# Patient Record
Sex: MALE | Race: WHITE | NOT HISPANIC OR LATINO | Employment: UNEMPLOYED | URBAN - NONMETROPOLITAN AREA
[De-identification: names, ages, dates, MRNs, and addresses within clinical notes are randomized per-mention and may not be internally consistent; named-entity substitution may affect disease eponyms.]

---

## 2023-11-20 ENCOUNTER — OFFICE VISIT (OUTPATIENT)
Dept: FAMILY MEDICINE CLINIC | Facility: CLINIC | Age: 33
End: 2023-11-20
Payer: COMMERCIAL

## 2023-11-20 VITALS
OXYGEN SATURATION: 97 % | HEIGHT: 71 IN | DIASTOLIC BLOOD PRESSURE: 90 MMHG | BODY MASS INDEX: 34.87 KG/M2 | HEART RATE: 69 BPM | WEIGHT: 249.1 LBS | SYSTOLIC BLOOD PRESSURE: 140 MMHG

## 2023-11-20 DIAGNOSIS — Z13.29 SCREENING FOR THYROID DISORDER: ICD-10-CM

## 2023-11-20 DIAGNOSIS — R19.7 DIARRHEA, UNSPECIFIED TYPE: Primary | ICD-10-CM

## 2023-11-20 DIAGNOSIS — K21.9 GASTROESOPHAGEAL REFLUX DISEASE, UNSPECIFIED WHETHER ESOPHAGITIS PRESENT: ICD-10-CM

## 2023-11-20 DIAGNOSIS — I82.890 BLOOD CLOT OF NECK VEIN: ICD-10-CM

## 2023-11-20 DIAGNOSIS — Z13.220 LIPID SCREENING: ICD-10-CM

## 2023-11-20 DIAGNOSIS — Z11.59 NEED FOR HEPATITIS C SCREENING TEST: ICD-10-CM

## 2023-11-20 DIAGNOSIS — Z13.1 SCREENING FOR DIABETES MELLITUS: ICD-10-CM

## 2023-11-20 RX ORDER — OMEPRAZOLE 40 MG/1
40 CAPSULE, DELAYED RELEASE ORAL DAILY
Qty: 30 CAPSULE | Refills: 2 | Status: SHIPPED | OUTPATIENT
Start: 2023-11-20
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
## 2023-11-21 PROBLEM — I82.890 BLOOD CLOT OF NECK VEIN: Status: ACTIVE | Noted: 2023-11-21

## 2023-11-26 PROBLEM — R19.7 DIARRHEA: Status: ACTIVE | Noted: 2023-11-26

## 2023-11-26 PROBLEM — K21.9 GASTROESOPHAGEAL REFLUX DISEASE: Status: ACTIVE | Noted: 2023-11-26

## 2023-11-26 NOTE — ASSESSMENT & PLAN NOTE
His diarrhea has been chronic since his splenectomy.  We will schedule him for a gallbladder ultrasound and for further evaluation with GI.  He is in agreement with this plan.

## 2023-11-26 NOTE — ASSESSMENT & PLAN NOTE
His GERD is chronic and not currently controlled.  I will start him on omeprazole and schedule him with GI for further evaluation.  He is in agreement with this plan.

## 2023-11-26 NOTE — ASSESSMENT & PLAN NOTE
He is unsure of the exact history of his clot, and he has not been on blood thinners for 5 years.  He was only treated for 1 to 2 months, and he is asymptomatic.  We will check for some clotting factors and then proceed pending results.

## 2023-11-28 ENCOUNTER — PATIENT ROUNDING (BHMG ONLY) (OUTPATIENT)
Dept: FAMILY MEDICINE CLINIC | Facility: CLINIC | Age: 33
End: 2023-11-28
Payer: COMMERCIAL

## 2023-11-28 NOTE — PROGRESS NOTES
November 28, 2023    Hello, may I speak with Chanda Fontanez?    My name is STEPHANIE      I am  with [facility]  [address] [place] [address] [address] [place] [address] [phone].    Before we get started may I verify your date of birth? 1990    I am calling to officially welcome you to our practice and ask about your recent visit. Is this a good time to talk? yes    Tell me about your visit with us. What things went well?  PROVIDER WAS VERY THOROUGH,       We're always looking for ways to make our patients' experiences even better. Do you have recommendations on ways we may improve?  no    Overall were you satisfied with your first visit to our practice? yes       I appreciate you taking the time to speak with me today. Is there anything else I can do for you? no      Thank you, and have a great day.

## 2023-12-05 ENCOUNTER — OFFICE VISIT (OUTPATIENT)
Dept: GASTROENTEROLOGY | Facility: CLINIC | Age: 33
End: 2023-12-05
Payer: MEDICAID

## 2023-12-05 VITALS
BODY MASS INDEX: 35.7 KG/M2 | TEMPERATURE: 98.6 F | WEIGHT: 255 LBS | DIASTOLIC BLOOD PRESSURE: 78 MMHG | OXYGEN SATURATION: 99 % | RESPIRATION RATE: 18 BRPM | SYSTOLIC BLOOD PRESSURE: 142 MMHG | HEIGHT: 71 IN | HEART RATE: 82 BPM

## 2023-12-05 DIAGNOSIS — R19.7 DIARRHEA, UNSPECIFIED TYPE: Primary | ICD-10-CM

## 2023-12-05 DIAGNOSIS — R10.31 INTERMITTENT RIGHT LOWER QUADRANT ABDOMINAL PAIN: ICD-10-CM

## 2023-12-05 DIAGNOSIS — K21.9 GASTROESOPHAGEAL REFLUX DISEASE, UNSPECIFIED WHETHER ESOPHAGITIS PRESENT: ICD-10-CM

## 2023-12-05 DIAGNOSIS — K92.1 BLOOD IN STOOL: ICD-10-CM

## 2023-12-05 DIAGNOSIS — R10.31 INTRACTABLE RIGHT LOWER QUADRANT ABDOMINAL PAIN: ICD-10-CM

## 2023-12-05 PROCEDURE — 99204 OFFICE O/P NEW MOD 45 MIN: CPT | Performed by: INTERNAL MEDICINE

## 2023-12-05 PROCEDURE — 1159F MED LIST DOCD IN RCRD: CPT | Performed by: INTERNAL MEDICINE

## 2023-12-05 PROCEDURE — 1160F RVW MEDS BY RX/DR IN RCRD: CPT | Performed by: INTERNAL MEDICINE
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
## 2024-01-22 RX ORDER — SODIUM, POTASSIUM,MAG SULFATES 17.5-3.13G
2 SOLUTION, RECONSTITUTED, ORAL ORAL TAKE AS DIRECTED
Qty: 354 ML | Refills: 0 | Status: SHIPPED | OUTPATIENT
Start: 2024-01-22

## 2024-01-26 ENCOUNTER — LAB (OUTPATIENT)
Dept: FAMILY MEDICINE CLINIC | Facility: CLINIC | Age: 34
End: 2024-01-26
Payer: MEDICAID

## 2024-01-26 DIAGNOSIS — I82.890 BLOOD CLOT OF NECK VEIN: ICD-10-CM

## 2024-01-26 DIAGNOSIS — Z13.1 SCREENING FOR DIABETES MELLITUS: ICD-10-CM

## 2024-01-26 DIAGNOSIS — Z11.59 NEED FOR HEPATITIS C SCREENING TEST: ICD-10-CM

## 2024-01-26 DIAGNOSIS — R19.7 DIARRHEA, UNSPECIFIED TYPE: ICD-10-CM

## 2024-01-26 DIAGNOSIS — Z13.29 SCREENING FOR THYROID DISORDER: ICD-10-CM

## 2024-01-26 DIAGNOSIS — K21.9 GASTROESOPHAGEAL REFLUX DISEASE, UNSPECIFIED WHETHER ESOPHAGITIS PRESENT: ICD-10-CM

## 2024-01-26 DIAGNOSIS — Z13.220 LIPID SCREENING: ICD-10-CM

## 2024-01-26 PROCEDURE — 36415 COLL VENOUS BLD VENIPUNCTURE: CPT | Performed by: PHYSICIAN ASSISTANT

## 2024-01-27 LAB
ALBUMIN SERPL-MCNC: 4.7 G/DL (ref 4.1–5.1)
ALBUMIN/GLOB SERPL: 2 {RATIO} (ref 1.2–2.2)
ALP SERPL-CCNC: 76 IU/L (ref 44–121)
ALT SERPL-CCNC: 66 IU/L (ref 0–44)
APTT PPP: 29 SEC (ref 24–33)
AST SERPL-CCNC: 24 IU/L (ref 0–40)
BASOPHILS # BLD AUTO: 0.1 X10E3/UL (ref 0–0.2)
BASOPHILS NFR BLD AUTO: 1 %
BILIRUB SERPL-MCNC: 0.3 MG/DL (ref 0–1.2)
BUN SERPL-MCNC: 18 MG/DL (ref 6–20)
BUN/CREAT SERPL: 21 (ref 9–20)
CALCIUM SERPL-MCNC: 9.9 MG/DL (ref 8.7–10.2)
CHLORIDE SERPL-SCNC: 103 MMOL/L (ref 96–106)
CHOLEST SERPL-MCNC: 176 MG/DL (ref 100–199)
CO2 SERPL-SCNC: 19 MMOL/L (ref 20–29)
CREAT SERPL-MCNC: 0.86 MG/DL (ref 0.76–1.27)
EGFRCR SERPLBLD CKD-EPI 2021: 119 ML/MIN/1.73
EOSINOPHIL # BLD AUTO: 0.2 X10E3/UL (ref 0–0.4)
EOSINOPHIL NFR BLD AUTO: 1 %
ERYTHROCYTE [DISTWIDTH] IN BLOOD BY AUTOMATED COUNT: 13.7 % (ref 11.6–15.4)
GLOBULIN SER CALC-MCNC: 2.3 G/DL (ref 1.5–4.5)
GLUCOSE SERPL-MCNC: 95 MG/DL (ref 70–99)
HBA1C MFR BLD: 5.9 % (ref 4.8–5.6)
HCT VFR BLD AUTO: 48.5 % (ref 37.5–51)
HCV AB SERPL QL IA: NORMAL
HCV IGG SERPL QL IA: NON REACTIVE
HDLC SERPL-MCNC: 35 MG/DL
HGB BLD-MCNC: 16.7 G/DL (ref 13–17.7)
IMM GRANULOCYTES # BLD AUTO: 0.3 X10E3/UL (ref 0–0.1)
IMM GRANULOCYTES NFR BLD AUTO: 1 %
INR PPP: 1 (ref 0.9–1.2)
IRON SATN MFR SERPL: 33 % (ref 15–55)
IRON SERPL-MCNC: 92 UG/DL (ref 38–169)
LDLC SERPL CALC-MCNC: 116 MG/DL (ref 0–99)
LYMPHOCYTES # BLD AUTO: 8.6 X10E3/UL (ref 0.7–3.1)
LYMPHOCYTES NFR BLD AUTO: 50 %
MCH RBC QN AUTO: 31.3 PG (ref 26.6–33)
MCHC RBC AUTO-ENTMCNC: 34.4 G/DL (ref 31.5–35.7)
MCV RBC AUTO: 91 FL (ref 79–97)
MONOCYTES # BLD AUTO: 1.5 X10E3/UL (ref 0.1–0.9)
MONOCYTES NFR BLD AUTO: 8 %
NEUTROPHILS # BLD AUTO: 6.8 X10E3/UL (ref 1.4–7)
NEUTROPHILS NFR BLD AUTO: 39 %
PLATELET # BLD AUTO: 552 X10E3/UL (ref 150–450)
POTASSIUM SERPL-SCNC: 4.7 MMOL/L (ref 3.5–5.2)
PROT SERPL-MCNC: 7 G/DL (ref 6–8.5)
PROTHROMBIN TIME: 10.5 SEC (ref 9.1–12)
RBC # BLD AUTO: 5.33 X10E6/UL (ref 4.14–5.8)
SODIUM SERPL-SCNC: 140 MMOL/L (ref 134–144)
TIBC SERPL-MCNC: 278 UG/DL (ref 250–450)
TRIGL SERPL-MCNC: 139 MG/DL (ref 0–149)
TSH SERPL DL<=0.005 MIU/L-ACNC: 3.33 UIU/ML (ref 0.45–4.5)
UIBC SERPL-MCNC: 186 UG/DL (ref 111–343)
VLDLC SERPL CALC-MCNC: 25 MG/DL (ref 5–40)
WBC # BLD AUTO: 17.5 X10E3/UL (ref 3.4–10.8)

## 2024-02-06 LAB
F5 P.R506Q BLD/T QL: NORMAL
IMP & REVIEW OF LAB RESULTS: NORMAL

## 2024-02-09 ENCOUNTER — OFFICE VISIT (OUTPATIENT)
Dept: FAMILY MEDICINE CLINIC | Facility: CLINIC | Age: 34
End: 2024-02-09
Payer: MEDICAID

## 2024-02-09 VITALS
HEART RATE: 82 BPM | WEIGHT: 254.6 LBS | BODY MASS INDEX: 35.64 KG/M2 | DIASTOLIC BLOOD PRESSURE: 80 MMHG | OXYGEN SATURATION: 97 % | HEIGHT: 71 IN | SYSTOLIC BLOOD PRESSURE: 120 MMHG

## 2024-02-09 DIAGNOSIS — K21.9 GASTROESOPHAGEAL REFLUX DISEASE WITHOUT ESOPHAGITIS: ICD-10-CM

## 2024-02-09 DIAGNOSIS — R19.7 DIARRHEA, UNSPECIFIED TYPE: Primary | ICD-10-CM

## 2024-02-09 PROCEDURE — 1160F RVW MEDS BY RX/DR IN RCRD: CPT | Performed by: PHYSICIAN ASSISTANT

## 2024-02-09 PROCEDURE — 1159F MED LIST DOCD IN RCRD: CPT | Performed by: PHYSICIAN ASSISTANT

## 2024-02-09 PROCEDURE — 99213 OFFICE O/P EST LOW 20 MIN: CPT | Performed by: PHYSICIAN ASSISTANT

## 2024-02-09 NOTE — PROGRESS NOTES
Office Note     Name: Chanda Fontanez    : 1990     MRN: [id number]     Chief Complaint  Diarrhea    Subjective     History of Present Illness:  Chanda Fontanez is a 33 y.o. male who presents today for eval of continued diarrhea. He states that he had to cancel his colonoscopy d/t having the flu, and they did not confirm his second appointment.  He states that he did not want to do the prep unless he was positive that he was having the procedure done, and he was unable to get in touch with anyone due to it being on a Monday. He has not had the U/S yet.  He states that he completely forgot about it.    He states that his diet has helped more with his GERD, so he has stopped the omeprazole.  He states that he thinks it may have been making his diarrhea worse as well.    He states that he was diagnosed with strep and an ear infection when he had his labs drawn, so he thinks that is why his levels were abnormal.      Past Medical History:   Past Medical History:   Diagnosis Date    Blood clot of neck vein 2023    Depression     GERD (gastroesophageal reflux disease)        Past Surgical History:   Past Surgical History:   Procedure Laterality Date    FACIAL RECONSTRUCTION SURGERY      FINGER SURGERY Right     LEG SURGERY Right     SPLENECTOMY  10/2021       Family History:   Family History   Problem Relation Age of Onset    Hashimoto's thyroiditis Mother     Coronary artery disease Mother     Arthritis Mother     Asthma Mother     Colon cancer Father     Alcohol abuse Father     Seizures Sister     Arthritis Sister     Seizures Maternal Grandmother     Arrhythmia Maternal Grandmother         was on blood thinner, not sure why    Alcohol abuse Maternal Grandfather     Asthma Sister     Colon polyps Neg Hx     Esophageal cancer Neg Hx        Social History:   Social History     Socioeconomic History    Marital status:    Tobacco Use    Smoking status: Every Day     Packs/day: 1.00     Years:  "20.00     Additional pack years: 0.00     Total pack years: 20.00     Types: Cigarettes    Smokeless tobacco: Never   Vaping Use    Vaping Use: Former    Substances: Nicotine   Substance and Sexual Activity    Alcohol use: Yes     Alcohol/week: 28.0 standard drinks of alcohol     Types: 28 Drinks containing 0.5 oz of alcohol per week    Drug use: Yes     Types: Marijuana    Sexual activity: Yes     Partners: Female     Birth control/protection: None       Immunizations:   Immunization History   Administered Date(s) Administered    Hib (PRP-D) 10/31/2021    Pneumococcal Conjugate 13-Valent (PCV13) 10/31/2021    Td (TDVAX) 11/10/2007    Trumenba(meningococcal B) 10/31/2021        Medications:     Current Outpatient Medications:     sodium-potassium-magnesium sulfates (Suprep Bowel Prep Kit) 17.5-3.13-1.6 GM/177ML solution oral solution, Take 2 bottles by mouth Take As Directed., Disp: 354 mL, Rfl: 0    Allergies:   Allergies   Allergen Reactions    Sulfa Antibiotics GI Intolerance       Objective     Vital Signs  /80 (BP Location: Right arm, Patient Position: Sitting, Cuff Size: Large Adult)   Pulse 82   Ht 180.3 cm (71\")   Wt 115 kg (254 lb 9.6 oz)   SpO2 97%   BMI 35.51 kg/m²   Estimated body mass index is 35.51 kg/m² as calculated from the following:    Height as of this encounter: 180.3 cm (71\").    Weight as of this encounter: 115 kg (254 lb 9.6 oz).    Physical Exam  Vitals and nursing note reviewed.   Constitutional:       General: He is not in acute distress.     Appearance: Normal appearance. He is not ill-appearing.   HENT:      Head: Normocephalic and atraumatic.      Nose: Nose normal.   Eyes:      Extraocular Movements: Extraocular movements intact.      Conjunctiva/sclera: Conjunctivae normal.      Pupils: Pupils are equal, round, and reactive to light.   Cardiovascular:      Rate and Rhythm: Normal rate and regular rhythm.      Heart sounds: Normal heart sounds.   Pulmonary:      Effort: " Pulmonary effort is normal.      Breath sounds: Normal breath sounds.   Abdominal:      General: Bowel sounds are normal.      Palpations: Abdomen is soft. There is no mass.      Tenderness: There is abdominal tenderness (RUQ). There is no right CVA tenderness, left CVA tenderness, guarding or rebound.   Musculoskeletal:      Cervical back: Normal range of motion and neck supple.      Right lower leg: No edema.      Left lower leg: No edema.   Skin:     General: Skin is warm.   Neurological:      General: No focal deficit present.      Mental Status: He is alert.   Psychiatric:         Mood and Affect: Mood normal.         Behavior: Behavior normal.          Assessment and Plan     Assessment/Plan:  Diagnoses and all orders for this visit:    1. Diarrhea, unspecified type (Primary)  Assessment & Plan:  His diarrhea is chronic but improving with diet.  He will follow-up with GI for his colonoscopy.      2. Gastroesophageal reflux disease without esophagitis  Assessment & Plan:  GERD has improved with diet changes.           Follow Up  Return for Annual Physical.    Pepper Lieberman PA-C  [facility]

## 2024-02-09 NOTE — PATIENT INSTRUCTIONS
Local Options for Counseling:  [facility]- [phone]  [facility]- [place] Office- [phone]  [facility]- [phone]  [facility]- [phone]  [facility]- [phone] or request an appointment online

## 2024-02-14 RX ORDER — SODIUM, POTASSIUM,MAG SULFATES 17.5-3.13G
2 SOLUTION, RECONSTITUTED, ORAL ORAL TAKE AS DIRECTED
Qty: 354 ML | Refills: 0 | Status: SHIPPED | OUTPATIENT
Start: 2024-02-14

## 2024-02-15 ENCOUNTER — PRIOR AUTHORIZATION (OUTPATIENT)
Dept: GASTROENTEROLOGY | Facility: CLINIC | Age: 34
End: 2024-02-15
Payer: MEDICAID

## 2024-08-09 ENCOUNTER — HOSPITAL ENCOUNTER (EMERGENCY)
Facility: HOSPITAL | Age: 34
Discharge: HOME | End: 2024-08-09
Attending: EMERGENCY MEDICINE
Payer: MEDICAID

## 2024-08-09 ENCOUNTER — OFFICE VISIT (OUTPATIENT)
Dept: FAMILY MEDICINE CLINIC | Facility: CLINIC | Age: 34
End: 2024-08-09
Payer: MEDICAID

## 2024-08-09 ENCOUNTER — APPOINTMENT (OUTPATIENT)
Dept: CT IMAGING | Facility: HOSPITAL | Age: 34
End: 2024-08-09
Payer: MEDICAID

## 2024-08-09 ENCOUNTER — APPOINTMENT (OUTPATIENT)
Dept: GENERAL RADIOLOGY | Facility: HOSPITAL | Age: 34
End: 2024-08-09
Payer: MEDICAID

## 2024-08-09 VITALS
HEART RATE: 64 BPM | SYSTOLIC BLOOD PRESSURE: 130 MMHG | BODY MASS INDEX: 34.06 KG/M2 | OXYGEN SATURATION: 99 % | RESPIRATION RATE: 18 BRPM | TEMPERATURE: 97.7 F | WEIGHT: 257 LBS | HEIGHT: 73 IN | DIASTOLIC BLOOD PRESSURE: 71 MMHG

## 2024-08-09 VITALS
HEIGHT: 71 IN | SYSTOLIC BLOOD PRESSURE: 142 MMHG | HEART RATE: 72 BPM | DIASTOLIC BLOOD PRESSURE: 80 MMHG | WEIGHT: 257 LBS | BODY MASS INDEX: 35.98 KG/M2 | OXYGEN SATURATION: 97 %

## 2024-08-09 DIAGNOSIS — R01.1 MURMUR: ICD-10-CM

## 2024-08-09 DIAGNOSIS — R09.1 PLEURISY: Primary | ICD-10-CM

## 2024-08-09 DIAGNOSIS — R07.9 CHEST PAIN, UNSPECIFIED TYPE: ICD-10-CM

## 2024-08-09 DIAGNOSIS — R42 DIZZINESS: ICD-10-CM

## 2024-08-09 DIAGNOSIS — F14.91 HISTORY OF COCAINE USE: ICD-10-CM

## 2024-08-09 DIAGNOSIS — R07.9 CHEST PAIN, UNSPECIFIED TYPE: Primary | ICD-10-CM

## 2024-08-09 LAB
ALBUMIN SERPL-MCNC: 4.6 G/DL (ref 3.5–5.2)
ALBUMIN/GLOB SERPL: 1.7 G/DL
ALP SERPL-CCNC: 75 U/L (ref 39–117)
ALT SERPL W P-5'-P-CCNC: 37 U/L (ref 1–41)
AMPHET+METHAMPHET UR QL: NEGATIVE
AMPHETAMINES UR QL: NEGATIVE
ANION GAP SERPL CALCULATED.3IONS-SCNC: 9 MMOL/L (ref 5–15)
AST SERPL-CCNC: 24 U/L (ref 1–40)
BARBITURATES UR QL SCN: NEGATIVE
BASOPHILS # BLD MANUAL: 0.27 10*3/MM3 (ref 0–0.2)
BASOPHILS NFR BLD MANUAL: 2 % (ref 0–1.5)
BENZODIAZ UR QL SCN: NEGATIVE
BILIRUB SERPL-MCNC: 0.3 MG/DL (ref 0–1.2)
BUN SERPL-MCNC: 14 MG/DL (ref 6–20)
BUN/CREAT SERPL: 14 (ref 7–25)
BUPRENORPHINE SERPL-MCNC: NEGATIVE NG/ML
CALCIUM SPEC-SCNC: 9.4 MG/DL (ref 8.6–10.5)
CANNABINOIDS SERPL QL: POSITIVE
CHLORIDE SERPL-SCNC: 107 MMOL/L (ref 98–107)
CO2 SERPL-SCNC: 24 MMOL/L (ref 22–29)
COCAINE UR QL: NEGATIVE
CREAT SERPL-MCNC: 1 MG/DL (ref 0.76–1.27)
D DIMER PPP FEU-MCNC: <0.27 MCGFEU/ML (ref 0–0.5)
DEPRECATED RDW RBC AUTO: 46.6 FL (ref 37–54)
EGFRCR SERPLBLD CKD-EPI 2021: 102.6 ML/MIN/1.73
EOSINOPHIL # BLD MANUAL: 0 10*3/MM3 (ref 0–0.4)
EOSINOPHIL NFR BLD MANUAL: 0 % (ref 0.3–6.2)
ERYTHROCYTE [DISTWIDTH] IN BLOOD BY AUTOMATED COUNT: 13.9 % (ref 12.3–15.4)
FENTANYL UR-MCNC: NEGATIVE NG/ML
FLUAV SUBTYP SPEC NAA+PROBE: NOT DETECTED
FLUBV RNA ISLT QL NAA+PROBE: NOT DETECTED
GEN 5 2HR TROPONIN T REFLEX: <6 NG/L
GLOBULIN UR ELPH-MCNC: 2.7 GM/DL
GLUCOSE SERPL-MCNC: 100 MG/DL (ref 65–99)
HCT VFR BLD AUTO: 46.7 % (ref 37.5–51)
HGB BLD-MCNC: 16.2 G/DL (ref 13–17.7)
HOLD SPECIMEN: NORMAL
LIPASE SERPL-CCNC: 21 U/L (ref 13–60)
LYMPHOCYTES # BLD MANUAL: 7.34 10*3/MM3 (ref 0.7–3.1)
LYMPHOCYTES NFR BLD MANUAL: 7 % (ref 5–12)
MCH RBC QN AUTO: 31.5 PG (ref 26.6–33)
MCHC RBC AUTO-ENTMCNC: 34.7 G/DL (ref 31.5–35.7)
MCV RBC AUTO: 90.9 FL (ref 79–97)
METAMYELOCYTES NFR BLD MANUAL: 1 % (ref 0–0)
METHADONE UR QL SCN: NEGATIVE
MONOCYTES # BLD: 0.95 10*3/MM3 (ref 0.1–0.9)
NEUTROPHILS # BLD AUTO: 4.9 10*3/MM3 (ref 1.7–7)
NEUTROPHILS NFR BLD MANUAL: 35 % (ref 42.7–76)
NEUTS BAND NFR BLD MANUAL: 1 % (ref 0–5)
NRBC SPEC MANUAL: 1 /100 WBC (ref 0–0.2)
NT-PROBNP SERPL-MCNC: <36 PG/ML (ref 0–450)
OPIATES UR QL: NEGATIVE
OXYCODONE UR QL SCN: NEGATIVE
PCP UR QL SCN: NEGATIVE
PLAT MORPH BLD: NORMAL
PLATELET # BLD AUTO: 466 10*3/MM3 (ref 140–450)
PMV BLD AUTO: 9.3 FL (ref 6–12)
POTASSIUM SERPL-SCNC: 4.2 MMOL/L (ref 3.5–5.2)
PROT SERPL-MCNC: 7.3 G/DL (ref 6–8.5)
RBC # BLD AUTO: 5.14 10*6/MM3 (ref 4.14–5.8)
RBC MORPH BLD: NORMAL
SARS-COV-2 RNA RESP QL NAA+PROBE: NOT DETECTED
SODIUM SERPL-SCNC: 140 MMOL/L (ref 136–145)
TRICYCLICS UR QL SCN: NEGATIVE
TROPONIN T DELTA: NORMAL
TROPONIN T SERPL HS-MCNC: <6 NG/L
VARIANT LYMPHS NFR BLD MANUAL: 48 % (ref 19.6–45.3)
VARIANT LYMPHS NFR BLD MANUAL: 6 % (ref 0–5)
WBC MORPH BLD: NORMAL
WBC NRBC COR # BLD AUTO: 13.6 10*3/MM3 (ref 3.4–10.8)
WHOLE BLOOD HOLD COAG: NORMAL
WHOLE BLOOD HOLD SPECIMEN: NORMAL

## 2024-08-09 PROCEDURE — 36415 COLL VENOUS BLD VENIPUNCTURE: CPT

## 2024-08-09 PROCEDURE — 99214 OFFICE O/P EST MOD 30 MIN: CPT | Performed by: PHYSICIAN ASSISTANT

## 2024-08-09 PROCEDURE — 93005 ELECTROCARDIOGRAM TRACING: CPT | Performed by: EMERGENCY MEDICINE

## 2024-08-09 PROCEDURE — 85379 FIBRIN DEGRADATION QUANT: CPT | Performed by: EMERGENCY MEDICINE

## 2024-08-09 PROCEDURE — 25510000001 IOPAMIDOL PER 1 ML: Performed by: EMERGENCY MEDICINE

## 2024-08-09 PROCEDURE — 85025 COMPLETE CBC W/AUTO DIFF WBC: CPT | Performed by: EMERGENCY MEDICINE

## 2024-08-09 PROCEDURE — 99285 EMERGENCY DEPT VISIT HI MDM: CPT

## 2024-08-09 PROCEDURE — 71275 CT ANGIOGRAPHY CHEST: CPT

## 2024-08-09 PROCEDURE — 25810000003 SODIUM CHLORIDE 0.9 % SOLUTION: Performed by: EMERGENCY MEDICINE

## 2024-08-09 PROCEDURE — 80307 DRUG TEST PRSMV CHEM ANLYZR: CPT | Performed by: EMERGENCY MEDICINE

## 2024-08-09 PROCEDURE — 93000 ELECTROCARDIOGRAM COMPLETE: CPT | Performed by: PHYSICIAN ASSISTANT

## 2024-08-09 PROCEDURE — 84484 ASSAY OF TROPONIN QUANT: CPT | Performed by: EMERGENCY MEDICINE

## 2024-08-09 PROCEDURE — 80053 COMPREHEN METABOLIC PANEL: CPT | Performed by: EMERGENCY MEDICINE

## 2024-08-09 PROCEDURE — 85007 BL SMEAR W/DIFF WBC COUNT: CPT | Performed by: EMERGENCY MEDICINE

## 2024-08-09 PROCEDURE — 93005 ELECTROCARDIOGRAM TRACING: CPT

## 2024-08-09 PROCEDURE — 83880 ASSAY OF NATRIURETIC PEPTIDE: CPT | Performed by: EMERGENCY MEDICINE

## 2024-08-09 PROCEDURE — 83690 ASSAY OF LIPASE: CPT | Performed by: EMERGENCY MEDICINE

## 2024-08-09 PROCEDURE — 87636 SARSCOV2 & INF A&B AMP PRB: CPT | Performed by: EMERGENCY MEDICINE

## 2024-08-09 PROCEDURE — 71045 X-RAY EXAM CHEST 1 VIEW: CPT

## 2024-08-09 RX ORDER — INDOMETHACIN 50 MG/1
50 CAPSULE ORAL
Qty: 15 CAPSULE | Refills: 0 | Status: SHIPPED | OUTPATIENT
Start: 2024-08-09 | End: 2024-08-14

## 2024-08-09 RX ORDER — ASPIRIN 81 MG/1
324 TABLET, CHEWABLE ORAL ONCE
Status: COMPLETED | OUTPATIENT
Start: 2024-08-09 | End: 2024-08-09

## 2024-08-09 RX ORDER — FLUTICASONE PROPIONATE 50 MCG
2 SPRAY, SUSPENSION (ML) NASAL DAILY
COMMUNITY

## 2024-08-09 RX ORDER — SODIUM CHLORIDE 0.9 % (FLUSH) 0.9 %
10 SYRINGE (ML) INJECTION AS NEEDED
Status: DISCONTINUED | OUTPATIENT
Start: 2024-08-09 | End: 2024-08-09 | Stop reason: HOSPADM

## 2024-08-09 RX ORDER — LEVOCETIRIZINE DIHYDROCHLORIDE 5 MG/1
5 TABLET, FILM COATED ORAL EVERY EVENING
COMMUNITY

## 2024-08-09 RX ADMIN — IOPAMIDOL 75 ML: 755 INJECTION, SOLUTION INTRAVENOUS at 15:31

## 2024-08-09 RX ADMIN — ASPIRIN 81 MG CHEWABLE TABLET 324 MG: 81 TABLET CHEWABLE at 16:59

## 2024-08-09 RX ADMIN — SODIUM CHLORIDE 1000 ML: 9 INJECTION, SOLUTION INTRAVENOUS at 16:59

## 2024-08-09 NOTE — ASSESSMENT & PLAN NOTE
With his chest pain, dizziness, borderline EKG, and history of drug use, I feel that he needs to be assessed in the emergency room.  He has some symptoms of possible myocarditis, and he needs an urgent evaluation.  He is in agreement to go, and he states that he does have someone who can take him.  I advised that he not drive himself.

## 2024-08-09 NOTE — ASSESSMENT & PLAN NOTE
His dizziness does align with vertigo, but I recommend evaluation in the ED due to other symptoms.  He is in agreement.  He states that he does have someone who can take him.  Patient advised not to drive.

## 2024-08-09 NOTE — ED PROVIDER NOTES
Subjective   History of Present Illness  34-year-old male who presents for evaluation of chest pain.  He reports that for the last 3 days he has pain just to the left of the center of the anterior chest which radiates straight through into his back.  He presented to his primary care physician in [place] had an EKG and they reported concern for pericarditis.  The patient was ultimately advised to present to the ER.  He has now presented here.  He has no preceding history of similar chest pain or pericarditis in the past. The patient also reportedly lightheaded or dizzy.  Reports feeling like the world is pending.  This is exacerbated whenever he is moving.  No reported fever.  No trauma to his chest.  No abdominal pain.  He does report nausea, denies any vomiting.  He reports black stool for the last week and he also reports having constant diarrhea.  He also reports some dysuria.  Previous history of splenectomy.  No other abdominal surgeries.  No other acute complaints.  No history of heart attack or coronary intervention.  The patient does smoke.  He denies high blood pressure or high cholesterol.  He does have family history of coronary artery disease in his mother and father less than 65.      Review of Systems   Constitutional:  Positive for fatigue. Negative for chills and fever.   HENT:  Negative for congestion, ear pain, postnasal drip, sinus pressure and sore throat.    Eyes:  Negative for pain, redness and visual disturbance.   Respiratory:  Negative for cough, chest tightness and shortness of breath.    Cardiovascular:  Positive for chest pain. Negative for palpitations and leg swelling.   Gastrointestinal:  Positive for blood in stool and diarrhea. Negative for abdominal pain, anal bleeding, nausea and vomiting.   Endocrine: Negative for polydipsia and polyuria.   Genitourinary:  Negative for difficulty urinating, dysuria, frequency and urgency.   Musculoskeletal:  Negative for arthralgias, back pain  and neck pain.   Skin:  Negative for pallor and rash.   Allergic/Immunologic: Negative for environmental allergies and immunocompromised state.   Neurological:  Positive for dizziness and light-headedness. Negative for weakness and headaches.   Hematological:  Negative for adenopathy.   Psychiatric/Behavioral:  Negative for confusion, self-injury and suicidal ideas. The patient is not nervous/anxious.    All other systems reviewed and are negative.      Past Medical History:   Diagnosis Date    Blood clot of neck vein 11/21/2023    Depression 2010    GERD (gastroesophageal reflux disease)        Allergies   Allergen Reactions    Amoxicillin Anaphylaxis    Sulfa Antibiotics GI Intolerance       Past Surgical History:   Procedure Laterality Date    FACIAL RECONSTRUCTION SURGERY      FINGER SURGERY Right     LEG SURGERY Right     SPLENECTOMY  10/2021       Family History   Problem Relation Age of Onset    Hashimoto's thyroiditis Mother     Coronary artery disease Mother     Arthritis Mother     Asthma Mother     Colon cancer Father     Alcohol abuse Father     Seizures Sister     Arthritis Sister     Seizures Maternal Grandmother     Arrhythmia Maternal Grandmother         was on blood thinner, not sure why    Alcohol abuse Maternal Grandfather     Asthma Sister     Colon polyps Neg Hx     Esophageal cancer Neg Hx        Social History     Socioeconomic History    Marital status:    Tobacco Use    Smoking status: Every Day     Current packs/day: 1.00     Average packs/day: 1 pack/day for 20.0 years (20.0 ttl pk-yrs)     Types: Cigarettes    Smokeless tobacco: Never   Vaping Use    Vaping status: Former    Substances: Nicotine   Substance and Sexual Activity    Alcohol use: Yes     Alcohol/week: 28.0 standard drinks of alcohol     Types: 28 Drinks containing 0.5 oz of alcohol per week    Drug use: Yes     Types: Marijuana    Sexual activity: Yes     Partners: Female     Birth control/protection: None            Objective   Physical Exam  Vitals and nursing note reviewed.   Constitutional:       General: He is not in acute distress.     Appearance: Normal appearance. He is well-developed. He is not toxic-appearing or diaphoretic.   HENT:      Head: Normocephalic and atraumatic.      Right Ear: External ear normal.      Left Ear: External ear normal.      Nose: Nose normal.   Eyes:      General: Lids are normal.      Pupils: Pupils are equal, round, and reactive to light.   Neck:      Trachea: No tracheal deviation.   Cardiovascular:      Rate and Rhythm: Normal rate and regular rhythm.      Pulses: No decreased pulses.      Heart sounds: Normal heart sounds. No murmur heard.     No friction rub. No gallop.   Pulmonary:      Effort: Pulmonary effort is normal. No respiratory distress.      Breath sounds: Normal breath sounds. No decreased breath sounds, wheezing, rhonchi or rales.   Chest:      Chest wall: Tenderness present. No mass, deformity, swelling, crepitus or edema.      Comments: Reproducible tenderness palpation just to the left midline over the anterior chest.  Abdominal:      General: Bowel sounds are normal.      Palpations: Abdomen is soft.      Tenderness: There is no abdominal tenderness. There is no guarding or rebound.   Musculoskeletal:         General: No deformity. Normal range of motion.      Cervical back: Normal range of motion and neck supple.   Lymphadenopathy:      Cervical: No cervical adenopathy.   Skin:     General: Skin is warm and dry.      Findings: No rash.   Neurological:      Mental Status: He is alert and oriented to person, place, and time.      Cranial Nerves: No cranial nerve deficit.      Sensory: No sensory deficit.   Psychiatric:         Speech: Speech normal.         Behavior: Behavior normal.         Thought Content: Thought content normal.         Judgment: Judgment normal.         Procedures           ED Course                                             Medical  Decision Making  Differential diagnosis includes acute coronary syndrome, chest wall pain, pleurisy, costochondritis, aortic dissection, pulmonary embolism, viral illness, of unspecified etiology.    COVID and flu test are negative.  Troponin x 2 remain normal.  Mild leukocytosis to 13,000 but this is consistent with the patient's baseline secondary to asplenia.  Normal kidney function and electrolytes.  Normal lipase.  Normal D-dimer effectively ruling out DVT or PE.    X-ray and CT angiogram of the chest are negative for any acute disease including no pneumonia, dissection, pneumothorax, or pulmonary embolism.    The patient had an extensive workup normal vital signs.  Unfortunately patient was in the ER for an extended period time.  The patient's significant other female who was not present upon initial evaluation became extremely upset because she did not feel like the patient had been evaluated all.  She unfortunately was not present whenever I first evaluated the patient in the [place] area and initiated the orders.  Due to the busy nature of the ER it did take longer than desired to communicate the results to the patient.  I had a student working with me on this given day and I asked the student to advise the patient of the results and ask any questions and I would follow-up to help answer those questions.  This made the patient's significant other even more upset that a student came to speak with her.  She requested the charge nurse.  She was also very upset that the patient had not received a nicotine patch.  I was unaware that this had been requested.  She was noted to be speaking in a very aggressive and violent tone to the charge nurse.  She made accusations and threats stating that she would cause harm to the medical staff if anything happened to him.  I ultimately asked the nursing staff to leave the room to help diffuse the situation as the significant other was initially directing all of her anger toward  the charge nurse who had just started her shift. I discussed the results with the patient and with the patient's significant other.  Relatively speaking the patient himself was quite calm and interacted appropriately, but his significant other was quite disruptive and made taking care of him more difficult.    The patient will be discharged with a course of Indocin for treatment of pleurisy.  Discharged appearing well.    It should be noted that upon review of the patient's previous medical records he did admit to a previous history of cocaine use to his primary care physician.  He had denied using any recently.  His urine drug screen was only positive for marijuana at this given time.  Given his use of cocaine this could exacerbate symptoms and will make him more likely to have early onset coronary artery disease.  He will be advised to not use such drugs and continued use of drugs may lead to development of new disease that was not identified on today's workup.    Problems Addressed:  Chest pain, unspecified type: complicated acute illness or injury with systemic symptoms  History of cocaine use: chronic illness or injury  Pleurisy: complicated acute illness or injury with systemic symptoms    Amount and/or Complexity of Data Reviewed  External Data Reviewed: labs, radiology and ECG.  Labs: ordered. Decision-making details documented in ED Course.  Radiology: ordered and independent interpretation performed. Decision-making details documented in ED Course.  ECG/medicine tests: ordered and independent interpretation performed. Decision-making details documented in ED Course.     Details: EKG independently interpreted by myself shows sinus rhythm with early repolarization with no acute ischemic changes.    Risk  OTC drugs.  Prescription drug management.        Final diagnoses:   Pleurisy   Chest pain, unspecified type   History of cocaine use       ED Disposition  ED Disposition       ED Disposition   Discharge     Condition   Stable    Comment   --               [facility]  [address]  [facility] [address] [phone]  Schedule an appointment as soon as possible for a visit       Pepper Lieberman PA-C  [address]  [place] [address]  [phone]    In 1 week           Medication List        New Prescriptions      indomethacin 50 MG capsule  Commonly known as: INDOCIN  Take 1 capsule by mouth 3 (Three) Times a Day With Meals for 5 days.               Where to Get Your Medications        These medications were sent to [id number] - [place], [place] - [address] - [phone]  - [phone]   [address] [facility] [address]      Phone: [phone]   indomethacin 50 MG capsule            Nuria Raya MD  08/12/24 7380

## 2024-08-09 NOTE — ASSESSMENT & PLAN NOTE
He has a chronic murmur which has not been evaluated per his history.  He will need to have cardiology follow-up.

## 2024-08-09 NOTE — PROGRESS NOTES
Office Note     Name: Chanda Fontanez    : 1990     MRN: [id number]     Chief Complaint  Hypertension, Dizziness, and Back Pain    Subjective     Chanda Fontanez is a 34 y.o. male here for evaluation of dizziness for the last couple of weeks and chest pain for the last couple of days.  He states that his dizziness only occurs with head movement, and it goes away completely if he is still.  He has had vertigo before, and it has usually been due to an ear infection.  He went to urgent care to have his ears checked, but she told him that he did not have an infection, just pressure in his ears.  His blood pressure was very high during that visit, but he cannot remember exactly what it was.  He has been using Flonase and Mucinex for his congestion, but it has not seemed to relieve any of the dizziness or pressure.  He has not had meclizine for his previous episodes of vertigo as far as he can recall.    He then went to the ER at [facility] earlier this week.  They did a CT scan of his head and his abdomen and pelvis, but he states they told him it was normal.  He states that they did not give him anything except some medicine through the IV and naproxen to take at home for the pain.    He states that his chest pain is sharp in the center of his chest radiating through to his back.  He denies any worsening heartburn or reflux symptoms, and he states that his pain does not seem to be related with exertion or eating.  He admits that he does take Tums at least twice daily generally.  He has not had any known cardiac problems, but he has had a heart murmur since he was 20 that has never been checked by a cardiologist.  He admits cocaine use, but he states that the last time was about a month ago intranasally.  He denies any other recent drug use.        Review of Systems:   Review of Systems   HENT:  Positive for congestion, dental problem, ear pain and sinus pressure. Negative for postnasal drip, rhinorrhea and sore  throat.    Respiratory:  Positive for chest tightness. Negative for shortness of breath.    Cardiovascular:  Positive for chest pain. Negative for palpitations and leg swelling.   Gastrointestinal:  Positive for nausea. Negative for vomiting and GERD.   Neurological:  Positive for dizziness (just with head movement). Negative for syncope.       Past Medical History:   Past Medical History:   Diagnosis Date    Blood clot of neck vein 11/21/2023    Depression 2010    GERD (gastroesophageal reflux disease)        Past Surgical History:   Past Surgical History:   Procedure Laterality Date    FACIAL RECONSTRUCTION SURGERY      FINGER SURGERY Right     LEG SURGERY Right     SPLENECTOMY  10/2021       Family History:   Family History   Problem Relation Age of Onset    Hashimoto's thyroiditis Mother     Coronary artery disease Mother     Arthritis Mother     Asthma Mother     Colon cancer Father     Alcohol abuse Father     Seizures Sister     Arthritis Sister     Seizures Maternal Grandmother     Arrhythmia Maternal Grandmother         was on blood thinner, not sure why    Alcohol abuse Maternal Grandfather     Asthma Sister     Colon polyps Neg Hx     Esophageal cancer Neg Hx        Social History:   Social History     Socioeconomic History    Marital status:    Tobacco Use    Smoking status: Every Day     Current packs/day: 1.00     Average packs/day: 1 pack/day for 20.0 years (20.0 ttl pk-yrs)     Types: Cigarettes    Smokeless tobacco: Never   Vaping Use    Vaping status: Former    Substances: Nicotine   Substance and Sexual Activity    Alcohol use: Yes     Alcohol/week: 28.0 standard drinks of alcohol     Types: 28 Drinks containing 0.5 oz of alcohol per week    Drug use: Yes     Types: Marijuana    Sexual activity: Yes     Partners: Female     Birth control/protection: None       Immunizations:   Immunization History   Administered Date(s) Administered    Hib (PRP-D) 10/31/2021    Pneumococcal Conjugate  "13-Valent (PCV13) 10/31/2021    Td (TDVAX) 11/10/2007    Trumenba(meningococcal B) 10/31/2021        Medications:   No current facility-administered medications for this visit.    Current Outpatient Medications:     levocetirizine (XYZAL) 5 MG tablet, Take 1 tablet by mouth Every Evening., Disp: , Rfl:     fluticasone (FLONASE) 50 MCG/ACT nasal spray, 2 sprays into the nostril(s) as directed by provider Daily., Disp: , Rfl:     sodium-potassium-magnesium sulfates (Suprep Bowel Prep Kit) 17.5-3.13-1.6 GM/177ML solution oral solution, Take 2 bottles by mouth Take As Directed. (Patient not taking: Reported on 8/9/2024), Disp: 354 mL, Rfl: 0    Facility-Administered Medications Ordered in Other Visits:     sodium chloride 0.9 % flush 10 mL, 10 mL, Intravenous, PRN, Nuria Raya MD    [COMPLETED] Insert Peripheral IV, , , Once **AND** sodium chloride 0.9 % flush 10 mL, 10 mL, Intravenous, PRN, Nuria Raya MD    Allergies:   Allergies   Allergen Reactions    Amoxicillin Anaphylaxis    Sulfa Antibiotics GI Intolerance       Objective     Vital Signs  /80 (BP Location: Right arm, Patient Position: Sitting, Cuff Size: Large Adult)   Pulse 72   Ht 180.3 cm (71\")   Wt 117 kg (257 lb)   SpO2 97%   BMI 35.84 kg/m²   Estimated body mass index is 35.84 kg/m² as calculated from the following:    Height as of this encounter: 180.3 cm (71\").    Weight as of this encounter: 117 kg (257 lb).      Physical Exam  Vitals and nursing note reviewed.   Constitutional:       General: He is not in acute distress.     Appearance: Normal appearance. He is not ill-appearing.   HENT:      Head: Normocephalic and atraumatic.      Ears:      Comments: TMs dull bilaterally  Cardiovascular:      Rate and Rhythm: Normal rate and regular rhythm.      Pulses: Normal pulses.      Heart sounds: Murmur heard.   Pulmonary:      Effort: Pulmonary effort is normal. No respiratory distress.      Breath sounds: Normal breath sounds. "   Musculoskeletal:      Right lower leg: No edema.      Left lower leg: No edema.   Skin:     General: Skin is warm and dry.   Neurological:      General: No focal deficit present.      Mental Status: He is alert and oriented to person, place, and time.      Motor: No weakness.      Coordination: Coordination normal.   Psychiatric:         Mood and Affect: Mood normal.         Behavior: Behavior normal.            ECG 12 Lead    Date/Time: 8/9/2024 6:25 PM  Performed by: Pepper Lieberman PA-C    Authorized by: Pepper Lieberman PA-C  Comparison: not compared with previous ECG   Previous ECG: no previous ECG available  Rhythm: sinus rhythm  BPM: 60    Clinical impression: abnormal EKG         Assessment and Plan       Diagnoses and all orders for this visit:    1. Chest pain, unspecified type (Primary)  Assessment & Plan:  With his chest pain, dizziness, borderline EKG, and history of drug use, I feel that he needs to be assessed in the emergency room.  He has some symptoms of possible myocarditis, and he needs an urgent evaluation.  He is in agreement to go, and he states that he does have someone who can take him.  I advised that he not drive himself.    Orders:  -     ECG 12 Lead    2. Dizziness  Assessment & Plan:  His dizziness does align with vertigo, but I recommend evaluation in the ED due to other symptoms.  He is in agreement.  He states that he does have someone who can take him.  Patient advised not to drive.    Orders:  -     ECG 12 Lead    3. Murmur  Assessment & Plan:  He has a chronic murmur which has not been evaluated per his history.  He will need to have cardiology follow-up.          Follow Up  Return for recheck 2-4 weeks.    Pepper Lieberman PA-C  [facility] Internal Medicine [facility]        Patient or patient representative verbalized consent for the use of Ambient Listening during the visit with  Pepper Lieberman PA-C for chart documentation. 8/9/2024   11:00 EDT

## 2024-08-09 NOTE — Clinical Note
[facility] EMERGENCY DEPARTMENT  [address]  [facility] [address]  Phone: [phone]    Chanda Fontanez was seen and treated in our emergency department on 8/9/2024.  He may return to work on 08/13/2024.         Thank you for choosing [facility].    Nuria Raya MD

## 2024-08-10 NOTE — DISCHARGE INSTRUCTIONS
Take indomethacin as prescribed.     Follow-up with [facility] for further outpatient workup.

## 2024-08-11 LAB
QT INTERVAL: 394 MS
QTC INTERVAL: 416 MS

## 2024-12-17 ENCOUNTER — OFFICE VISIT (OUTPATIENT)
Dept: FAMILY MEDICINE CLINIC | Facility: CLINIC | Age: 34
End: 2024-12-17
Payer: COMMERCIAL

## 2024-12-17 VITALS
OXYGEN SATURATION: 95 % | SYSTOLIC BLOOD PRESSURE: 138 MMHG | BODY MASS INDEX: 33.15 KG/M2 | DIASTOLIC BLOOD PRESSURE: 94 MMHG | RESPIRATION RATE: 18 BRPM | WEIGHT: 250.1 LBS | HEIGHT: 73 IN | TEMPERATURE: 97.3 F | HEART RATE: 72 BPM

## 2024-12-17 DIAGNOSIS — K04.7 DENTAL ABSCESS: Primary | ICD-10-CM

## 2024-12-17 DIAGNOSIS — Z87.898 HISTORY OF SNORING: ICD-10-CM

## 2024-12-17 PROCEDURE — 99214 OFFICE O/P EST MOD 30 MIN: CPT | Performed by: NURSE PRACTITIONER

## 2024-12-17 PROCEDURE — 1125F AMNT PAIN NOTED PAIN PRSNT: CPT | Performed by: NURSE PRACTITIONER

## 2024-12-17 RX ORDER — CHLORHEXIDINE GLUCONATE ORAL RINSE 1.2 MG/ML
15 SOLUTION DENTAL 2 TIMES DAILY
Qty: 420 ML | Refills: 0 | Status: SHIPPED | OUTPATIENT
Start: 2024-12-17 | End: 2024-12-31

## 2024-12-17 RX ORDER — AZITHROMYCIN 250 MG/1
TABLET, FILM COATED ORAL
Qty: 6 TABLET | Refills: 0 | Status: SHIPPED | OUTPATIENT
Start: 2024-12-17

## 2024-12-17 NOTE — PROGRESS NOTES
Office Note     Name: Chanda Fontanez    : 1990     MRN: [id number]     Chief Complaint  Dental Pain (X 3 days. Left lower side )    Subjective     History of Present Illness:  Chanda Fontanez is a 34 y.o. male who presents today for impacted wisdom tooth. Has surgery scheduled for this Friday, but he is concerned as is infected. He c/o pain and swelling to area. Has been using orajel and ibuprofen but this isn't helping. He can't fully close mouth due to swelling.     He also is requesting referral to sleep medicine, as he states his son had a sleep study done and the nurse doing the evaluation inform patient he also stopped breathing several times during the night.  Sometimes he will wake up feeling swollen in his face.   denies shortness of breath.  He does state has been told he snores.    Review of Systems:   Review of Systems as per HPI.    Family History:   Family History   Problem Relation Age of Onset    Hashimoto's thyroiditis Mother     Coronary artery disease Mother     Arthritis Mother     Asthma Mother     Colon cancer Father     Alcohol abuse Father     Seizures Sister     Arthritis Sister     Seizures Maternal Grandmother     Arrhythmia Maternal Grandmother         was on blood thinner, not sure why    Alcohol abuse Maternal Grandfather     Asthma Sister     Colon polyps Neg Hx     Esophageal cancer Neg Hx        Social History:   Social History     Socioeconomic History    Marital status:    Tobacco Use    Smoking status: Every Day     Current packs/day: 1.00     Average packs/day: 1 pack/day for 20.0 years (20.0 ttl pk-yrs)     Types: Cigarettes    Smokeless tobacco: Never   Vaping Use    Vaping status: Former    Substances: Nicotine   Substance and Sexual Activity    Alcohol use: Yes     Alcohol/week: 28.0 standard drinks of alcohol     Types: 28 Drinks containing 0.5 oz of alcohol per week    Drug use: Yes     Types: Marijuana    Sexual activity: Yes     Partners: Female      "Birth control/protection: None       Past Medical History:   Past Medical History:   Diagnosis Date    Blood clot of neck vein 11/21/2023    Depression 2010    GERD (gastroesophageal reflux disease)        Past Surgical History:   Past Surgical History:   Procedure Laterality Date    FACIAL RECONSTRUCTION SURGERY      FINGER SURGERY Right     LEG SURGERY Right     SPLENECTOMY  10/2021       Immunizations:   Immunization History   Administered Date(s) Administered    Hib (PRP-D) 10/31/2021    Pneumococcal Conjugate 13-Valent (PCV13) 10/31/2021    Td (TDVAX) 11/10/2007    Trumenba(meningococcal B) 10/31/2021        Medications:     Current Outpatient Medications:     fluticasone (FLONASE) 50 MCG/ACT nasal spray, Administer 2 sprays into the nostril(s) as directed by provider Daily., Disp: , Rfl:     azithromycin (Zithromax) 250 MG tablet, Take 2 tabs by mouth once daily, then 1 tab by mouth once daily for 4 days., Disp: 6 tablet, Rfl: 0    chlorhexidine (PERIDEX) 0.12 % solution, Apply 15 mL to the mouth or throat 2 (Two) Times a Day for 14 days., Disp: 420 mL, Rfl: 0    levocetirizine (XYZAL) 5 MG tablet, Take 1 tablet by mouth Every Evening. (Patient not taking: Reported on 12/17/2024), Disp: , Rfl:     Allergies:   Allergies   Allergen Reactions    Amoxicillin Anaphylaxis    Sulfa Antibiotics GI Intolerance       Objective     Vital Signs  /94 (BP Location: Left arm, Patient Position: Sitting, Cuff Size: Adult)   Pulse 72   Temp 97.3 °F (36.3 °C) (Infrared)   Resp 18   Ht 185.4 cm (72.99\")   Wt 113 kg (250 lb 1.6 oz)   SpO2 95%   BMI 33.00 kg/m²   Estimated body mass index is 33 kg/m² as calculated from the following:    Height as of this encounter: 185.4 cm (72.99\").    Weight as of this encounter: 113 kg (250 lb 1.6 oz).          Physical Exam  Vitals and nursing note reviewed.   Constitutional:       General: He is not in acute distress.     Appearance: Normal appearance. He is ill-appearing. "   HENT:      Head: Normocephalic and atraumatic.        Comments: Moderate tenderness to left maxillary area.  No anterior cervical, submental or preauricular lymphadenopathy noted.     Nose: Nose normal.      Mouth/Throat:      Mouth: Mucous membranes are moist.      Dentition: Abnormal dentition. Gingival swelling and dental caries present.        Comments: Left wisdom tooth somewhat impacted, with moderate gingival swelling and erythema.  No drainage noted.  Eyes:      Conjunctiva/sclera: Conjunctivae normal.      Pupils: Pupils are equal, round, and reactive to light.   Cardiovascular:      Rate and Rhythm: Normal rate and regular rhythm.      Pulses: Normal pulses.      Heart sounds: Normal heart sounds.   Pulmonary:      Effort: Pulmonary effort is normal.      Breath sounds: Normal breath sounds.   Abdominal:      General: Abdomen is flat.      Palpations: Abdomen is soft.   Musculoskeletal:      Cervical back: Normal range of motion and neck supple.   Skin:     General: Skin is warm and dry.   Neurological:      Mental Status: He is alert and oriented to person, place, and time.   Psychiatric:         Mood and Affect: Mood normal.         Thought Content: Thought content normal.          Procedures    Assessment and Plan     1. Dental abscess    - azithromycin (Zithromax) 250 MG tablet; Take 2 tabs by mouth once daily, then 1 tab by mouth once daily for 4 days.  Dispense: 6 tablet; Refill: 0  - chlorhexidine (PERIDEX) 0.12 % solution; Apply 15 mL to the mouth or throat 2 (Two) Times a Day for 14 days.  Dispense: 420 mL; Refill: 0     2. History of snoring  Ambulatory referral to sleep medicine    Reviewed use of Zithromax and Peridex, including side effects.  Patient to continue use of ibuprofen 800 mg twice daily, with alternating Tylenol midday 1 g.  Patient to also continue salt water gargles and keep surgical appointment on Friday.  Will make referral for sleep medicine/sleep study.  Return to clinic for  any further concerns.  Patient stated understanding and agreed with plan of care.    Follow Up  Return if symptoms worsen or fail to improve, for Recheck.    [facility]  [address] [place] [address] DR DANNA DE SANTIAGO [id number] [phone]